# Patient Record
(demographics unavailable — no encounter records)

---

## 2025-07-25 NOTE — PHYSICAL EXAM
[Normal] : mucosa is normal [Midline] : trachea located in midline position [FreeTextEntry1] : voice mildly hoarse, decreased projection and range

## 2025-07-25 NOTE — ASSESSMENT
[FreeTextEntry1] : - 7/25/25: 76 y/o F is referred by Dr. Prabhakar for possible vocal fold lesion and dysphagia. Her visiting nurse feels she has been soft spoken for the past year. Her daughter in law is concerned she may have difficulty swallowing. There are occasions when she coughs after she has soup, but it passes. On physical exam she was found to have evidence of prebyphonia and LPR. I recommended voice hygiene. I am recommending MBS and esophagram and to follow up after for review.  -Voice hygiene  -Family to obtain Dr. Prabhakar's office notes -MBS -esophagram -Follow up after for review

## 2025-07-25 NOTE — CONSULT LETTER
[Dear  ___] : Dear  [unfilled], [Consult Letter:] : I had the pleasure of evaluating your patient, [unfilled]. [Please see my note below.] : Please see my note below. [Consult Closing:] : Thank you very much for allowing me to participate in the care of this patient.  If you have any questions, please do not hesitate to contact me. [Sincerely,] : Sincerely, [FreeTextEntry3] : Pernell Michael MD Director; [facility] Otolaryngology - Head and Neck Surgery [facility] and [facility]   Department of Otolaryngology [facility] of Medicine at [facility]  [address] [place] [address], 10[address] [place], [address] Office Tel: [phone]

## 2025-07-25 NOTE — HISTORY OF PRESENT ILLNESS
[de-identified] : 7/25/25: 76 y/o F is referred by Dr. Prabhakar for possible "vocal fold lesion" and dysphagia. Her visiting nurse feels she has been soft spoken for the past year. Her daughter in law is concerned she may have difficulty swallowing. There are occasions when she coughs after she has soup, but it passes. No breathing issues or regurgitation of food. Not currently working, minimal voice demands. Nonsmoker. History of esophageal lesion, noncancerous, removed in 1998. She is on Omeprazole 40 mg BID for reflux. Patient with limited mentation and some difficulty answering questions.

## 2025-07-25 NOTE — PROCEDURE
[de-identified] : -   Pre-operative Diagnosis: Dysphonia Post-operative Diagnosis: Bilateral vocal fold atrophy, LPR Anesthesia: Topical - 1 % Lidocaine/Phenylephrine Procedure: Flexible Laryngoscopy with Stroboscopy - [facility] [id number]   Procedure Details: The patient was placed in the sitting position. After decongestant and anesthesia were applied the laryngoscope was passed. The nasal cavities, nasopharynx, oropharynx, hypopharynx, and larynx were all examined. Vocal folds were examined during respiration and phonation. The following findings were noted:   Findings: Nose: Septum is midline, turbinates are normal, nasal airways patent, mucosa normal Nasopharynx: Adenoids normal, no masses, eustachian tube normal Oropharynx: Pharyngeal walls symmetric and without lesion. Tonsils/fossae symmetric Hypopharynx: Hypopharynx and pyriform sinuses without lesion. No masses or asymmetry. No pooling of secretions. Larynx: Epiglottis and aryepiglottic folds were sharp and crisp bilaterally. Bilateral false vocal folds normal appearance. Airway was widely patent. postcricoid edema   Strobe Exam Ratings   TVF Appearance: bilateral vocal fold atrophy, mild erythema of vocal process TVF Mobility: unremarkable Edema/hypertrophy: significant vocal cord atrophy Mucus on TVF: minimal Glottic Closure: + glottic gap Mucosal Wave: symmetric Amplitude of Vibration: reduced Phase: symmetric  Supraglottic Hyperfunction: significant Other Findings: Presbylarynges   Condition: Stable. Patient tolerated procedure well.   Complications: None     _____________________________________________________ Procedure: Pharyngeal and speech evaluation, by cine or video - CPT 52597 Voice assessment was recorded via video recording on this date.   Clarity: Hoarse Range: Significantly reduced Pitch Control: Unable to control pitch Projection: Significantly reduced Tremor: None Cough: None   Condition: Stable. Patient tolerated procedure well. Complications: None.